# Patient Record
Sex: MALE | Race: WHITE | NOT HISPANIC OR LATINO | Employment: FULL TIME | URBAN - NONMETROPOLITAN AREA
[De-identification: names, ages, dates, MRNs, and addresses within clinical notes are randomized per-mention and may not be internally consistent; named-entity substitution may affect disease eponyms.]

---

## 2017-10-03 ENCOUNTER — APPOINTMENT (OUTPATIENT)
Dept: OCCUPATIONAL MEDICINE | Facility: OTHER | Age: 32
End: 2017-10-03

## 2017-10-03 ENCOUNTER — APPOINTMENT (OUTPATIENT)
Dept: CHIROPRACTIC MEDICINE | Facility: OTHER | Age: 32
End: 2017-10-03

## 2017-10-03 PROCEDURE — 92499 UNLISTED OPH SVC/PROCEDURE: CPT

## 2017-10-03 PROCEDURE — 92552 PURE TONE AUDIOMETRY AIR: CPT

## 2017-10-03 PROCEDURE — 99499 UNLISTED E&M SERVICE: CPT

## 2017-10-03 PROCEDURE — 99000 SPECIMEN HANDLING OFFICE-LAB: CPT

## 2024-05-11 ENCOUNTER — HOSPITAL ENCOUNTER (EMERGENCY)
Facility: HOSPITAL | Age: 39
Discharge: HOME | End: 2024-05-12
Attending: FAMILY MEDICINE
Payer: COMMERCIAL

## 2024-05-11 DIAGNOSIS — L03.116 CELLULITIS OF LEFT LOWER EXTREMITY: ICD-10-CM

## 2024-05-11 PROCEDURE — 99284 EMERGENCY DEPT VISIT MOD MDM: CPT | Mod: 25

## 2024-05-11 PROCEDURE — 250N000013 HC RX MED GY IP 250 OP 250 PS 637: Performed by: FAMILY MEDICINE

## 2024-05-11 PROCEDURE — 99284 EMERGENCY DEPT VISIT MOD MDM: CPT | Performed by: FAMILY MEDICINE

## 2024-05-11 RX ORDER — SULFAMETHOXAZOLE/TRIMETHOPRIM 800-160 MG
1 TABLET ORAL 2 TIMES DAILY
Qty: 20 TABLET | Refills: 0 | Status: SHIPPED | OUTPATIENT
Start: 2024-05-11 | End: 2024-05-21

## 2024-05-11 RX ORDER — SULFAMETHOXAZOLE/TRIMETHOPRIM 800-160 MG
1 TABLET ORAL ONCE
Status: COMPLETED | OUTPATIENT
Start: 2024-05-11 | End: 2024-05-11

## 2024-05-11 RX ADMIN — SULFAMETHOXAZOLE AND TRIMETHOPRIM 1 TABLET: 800; 160 TABLET ORAL at 23:43

## 2024-05-11 ASSESSMENT — COLUMBIA-SUICIDE SEVERITY RATING SCALE - C-SSRS
6. HAVE YOU EVER DONE ANYTHING, STARTED TO DO ANYTHING, OR PREPARED TO DO ANYTHING TO END YOUR LIFE?: NO
2. HAVE YOU ACTUALLY HAD ANY THOUGHTS OF KILLING YOURSELF IN THE PAST MONTH?: NO
1. IN THE PAST MONTH, HAVE YOU WISHED YOU WERE DEAD OR WISHED YOU COULD GO TO SLEEP AND NOT WAKE UP?: NO

## 2024-05-12 ENCOUNTER — APPOINTMENT (OUTPATIENT)
Dept: ULTRASOUND IMAGING | Facility: HOSPITAL | Age: 39
End: 2024-05-12
Attending: FAMILY MEDICINE
Payer: COMMERCIAL

## 2024-05-12 VITALS
SYSTOLIC BLOOD PRESSURE: 162 MMHG | TEMPERATURE: 97.5 F | HEART RATE: 79 BPM | RESPIRATION RATE: 18 BRPM | DIASTOLIC BLOOD PRESSURE: 101 MMHG | OXYGEN SATURATION: 95 %

## 2024-05-12 PROCEDURE — 93971 EXTREMITY STUDY: CPT | Mod: LT

## 2024-05-12 NOTE — ED PROVIDER NOTES
History     Chief Complaint   Patient presents with    Leg Pain     HPI  Savage Henley is a 38 year old male who presented to the ER with a chief complaint of left leg swelling and redness noted this evening.  Patient stated he had similar episode of left leg cellulitis years ago.  Stated he injured this leg 2 weeks ago and had a small healed abrasion of the anterior shin of the left leg otherwise denies any chest pain or shortness of breath.  Denies any other concern or complaint.  Denies any fever or chills.    Allergies:  Allergies   Allergen Reactions    Penicillins Anaphylaxis       Problem List:    There are no problems to display for this patient.       Past Medical History:    History reviewed. No pertinent past medical history.    Past Surgical History:    History reviewed. No pertinent surgical history.    Family History:    History reviewed. No pertinent family history.    Social History:  Marital Status:   [2]  Social History     Tobacco Use    Smoking status: Never    Smokeless tobacco: Never   Substance Use Topics    Alcohol use: Yes     Comment: occasional    Drug use: Not Currently        Medications:    sulfamethoxazole-trimethoprim (BACTRIM DS) 800-160 MG tablet          Review of Systems   All other systems reviewed and are negative.      Physical Exam   BP: 162/95  Pulse: 79  Temp: 97.5  F (36.4  C)  Resp: 18  SpO2: 96 %      Physical Exam  Constitutional:       General: He is not in acute distress.     Appearance: He is not ill-appearing, toxic-appearing or diaphoretic.   HENT:      Head: Atraumatic.      Nose: Nose normal. No congestion or rhinorrhea.   Eyes:      General: No scleral icterus.        Left eye: No discharge.      Extraocular Movements: Extraocular movements intact.      Conjunctiva/sclera: Conjunctivae normal.      Pupils: Pupils are equal, round, and reactive to light.   Neck:      Vascular: No carotid bruit.   Cardiovascular:      Rate and Rhythm: Normal rate and  regular rhythm.      Heart sounds: Normal heart sounds. No murmur heard.  Pulmonary:      Effort: Pulmonary effort is normal. No respiratory distress.      Breath sounds: Normal breath sounds. No stridor. No wheezing, rhonchi or rales.   Chest:      Chest wall: No tenderness.   Abdominal:      General: Bowel sounds are normal. There is no distension.      Palpations: Abdomen is soft. There is no mass.      Tenderness: There is no abdominal tenderness. There is no right CVA tenderness, left CVA tenderness, guarding or rebound.      Hernia: No hernia is present.   Musculoskeletal:         General: No swelling, tenderness, deformity or signs of injury.      Cervical back: Normal range of motion and neck supple. No rigidity or tenderness.      Right lower leg: No edema.      Left lower leg: No edema.      Comments: Small healing abrasion of the anterior shin of the left leg.  Minimal puffiness of the left foot and ankle.  No tenderness.  Minimal erythema.  Negative Homans' sign.   Lymphadenopathy:      Cervical: No cervical adenopathy.   Skin:     General: Skin is warm.      Coloration: Skin is not jaundiced or pale.      Findings: No bruising, erythema, lesion or rash.   Neurological:      General: No focal deficit present.      Mental Status: He is oriented to person, place, and time. Mental status is at baseline.      Cranial Nerves: No cranial nerve deficit.      Sensory: No sensory deficit.      Motor: No weakness.      Coordination: Coordination normal.      Gait: Gait normal.      Deep Tendon Reflexes: Reflexes normal.   Psychiatric:         Mood and Affect: Mood normal.         ED Course   Patient was seen and examined shortly after arrival.  Stable.  Doppler ultrasound negative for DVT per prelim report.  Official reading by radiologist still pending..  Possible early cellulitis.  He is allergic to penicillin.  Started on Bactrim.  Advised to    Rest and stay well-hydrated  Alternate Tylenol and ibuprofen for  pain and discomfort or fever  Close follow-up with PCP  Come back for any concern or any worsening symptoms  Patient agrees with the plan.  Stable for discharge.     Procedures           No results found for this or any previous visit (from the past 24 hour(s)).    Medications   sulfamethoxazole-trimethoprim (BACTRIM DS) 800-160 MG per tablet 1 tablet (1 tablet Oral $Given 5/11/24 8065)       Assessments & Plan (with Medical Decision Making)     I have reviewed the nursing notes.    I have reviewed the findings, diagnosis, plan and need for follow up with the patient.        New Prescriptions    SULFAMETHOXAZOLE-TRIMETHOPRIM (BACTRIM DS) 800-160 MG TABLET    Take 1 tablet by mouth 2 times daily for 10 days       Final diagnoses:   Cellulitis of left lower extremity       5/11/2024   [facility]       Maribell Griffin MD  05/12/24 0036

## 2024-05-12 NOTE — ED NOTES
Patient presents with complaints of some left ankle swelling earlier in the day. He states he thought it was reddened and warm . He does endorse a fall 2 weeks on Monday he states there's some pain on the inside of his knee and there is also pain when he points his toes on the left foot. He says it starts just above the ankle and down the top of his foot. There is no redness, swelling, or warmth to the touch at this time.

## 2024-05-12 NOTE — ED TRIAGE NOTES
Patient presents with c/o left foot and ankle swelling that started today. Denies any injury to area. HX of cellulitis. Patient reports left shin in tender to the touch. Left foot, ankle, and shin red and warm. Denies any fevers at home. Ambulated into triage steady gait.

## 2025-02-02 ENCOUNTER — HOSPITAL ENCOUNTER (EMERGENCY)
Facility: HOSPITAL | Age: 40
Discharge: HOME | End: 2025-02-02
Attending: PHYSICIAN ASSISTANT
Payer: COMMERCIAL

## 2025-02-02 VITALS
HEART RATE: 99 BPM | TEMPERATURE: 98.4 F | BODY MASS INDEX: 37.69 KG/M2 | WEIGHT: 284.4 LBS | RESPIRATION RATE: 16 BRPM | DIASTOLIC BLOOD PRESSURE: 93 MMHG | SYSTOLIC BLOOD PRESSURE: 167 MMHG | HEIGHT: 73 IN | OXYGEN SATURATION: 97 %

## 2025-02-02 DIAGNOSIS — K04.01 ACUTE PULPITIS: ICD-10-CM

## 2025-02-02 DIAGNOSIS — K08.89 PAIN, DENTAL: ICD-10-CM

## 2025-02-02 PROCEDURE — 99213 OFFICE O/P EST LOW 20 MIN: CPT | Performed by: PHYSICIAN ASSISTANT

## 2025-02-02 PROCEDURE — G0463 HOSPITAL OUTPT CLINIC VISIT: HCPCS

## 2025-02-02 RX ORDER — LISINOPRIL 20 MG/1
20 TABLET ORAL DAILY
COMMUNITY

## 2025-02-02 RX ORDER — DOXYCYCLINE 100 MG/1
100 CAPSULE ORAL 2 TIMES DAILY
Qty: 14 CAPSULE | Refills: 0 | Status: SHIPPED | OUTPATIENT
Start: 2025-02-02 | End: 2025-02-09

## 2025-02-02 ASSESSMENT — ENCOUNTER SYMPTOMS
TROUBLE SWALLOWING: 0
DYSURIA: 0
RHINORRHEA: 0
ACTIVITY CHANGE: 0
SLEEP DISTURBANCE: 1
FEVER: 0
SORE THROAT: 0
FACIAL SWELLING: 0
COUGH: 0
WOUND: 0
ABDOMINAL PAIN: 0
EYE REDNESS: 0
BACK PAIN: 0
APPETITE CHANGE: 0
HEADACHES: 1

## 2025-02-02 NOTE — DISCHARGE INSTRUCTIONS
Follow up with dentist ASAP.     Ibuprofen and tylenol for pain. Can take up to 800 mg of ibuprofen every 6 hours if needed (avoid high dose for >2 weeks).

## 2025-02-02 NOTE — ED PROVIDER NOTES
"  History     Chief Complaint   Patient presents with    Dental Pain     HPI  Savage Henley is a 39 year old male who presents for evaluation of right lower dental pain. He believes his crown cracked about 1 week ago. Since the area around the tooth has become progressively painful and feels swollen. It feels a little awkward to swallow. He has tried tylenol without relief of pain. He will call tomorrow to make a dentist appointment but wants to know what to do in the meantime.     Allergies:  Allergies   Allergen Reactions    Penicillins Anaphylaxis       Problem List:    There are no active problems to display for this patient.       Past Medical History:    No past medical history on file.    Past Surgical History:    No past surgical history on file.    Family History:    No family history on file.    Social History:  Marital Status:   [2]  Social History     Tobacco Use    Smoking status: Never    Smokeless tobacco: Never   Substance Use Topics    Alcohol use: Yes     Comment: occasional    Drug use: Not Currently        Medications:    doxycycline hyclate (VIBRAMYCIN) 100 MG capsule  lisinopril (ZESTRIL) 20 MG tablet          Review of Systems   Constitutional:  Negative for activity change, appetite change and fever.   HENT:  Positive for dental problem. Negative for congestion, facial swelling, rhinorrhea, sore throat and trouble swallowing.    Eyes:  Negative for redness.   Respiratory:  Negative for cough.    Cardiovascular:  Negative for chest pain.   Gastrointestinal:  Negative for abdominal pain.   Genitourinary:  Negative for dysuria.   Musculoskeletal:  Negative for back pain.   Skin:  Negative for wound.   Neurological:  Positive for headaches.   Psychiatric/Behavioral:  Positive for sleep disturbance.        Physical Exam   BP: (!) 167/93  Pulse: 99  Temp: 98.4  F (36.9  C)  Resp: 16  Height: 185.4 cm (6' 1\")  Weight: 129 kg (284 lb 6.4 oz)  SpO2: 97 %      Physical Exam  Vitals and nursing " note reviewed.   Constitutional:       General: He is not in acute distress.     Appearance: Normal appearance. He is normal weight.   HENT:      Head: Normocephalic and atraumatic.      Jaw: Tenderness (Lower mid right) present. No trismus or swelling.      Salivary Glands: Right salivary gland is not diffusely enlarged. Left salivary gland is not diffusely enlarged.      Right Ear: External ear normal.      Left Ear: External ear normal.      Nose: Nose normal.      Mouth/Throat:      Mouth: Mucous membranes are moist.      Tongue: Tongue does not deviate from midline.      Palate: No mass.      Pharynx: Uvula midline. No pharyngeal swelling.     Eyes:      Conjunctiva/sclera: Conjunctivae normal.   Pulmonary:      Effort: Pulmonary effort is normal.   Abdominal:      General: Abdomen is flat.   Musculoskeletal:         General: Normal range of motion.      Cervical back: Normal range of motion.   Skin:     General: Skin is warm.      Capillary Refill: Capillary refill takes less than 2 seconds.   Neurological:      General: No focal deficit present.      Mental Status: He is alert. Mental status is at baseline.   Psychiatric:         Mood and Affect: Mood normal.         Behavior: Behavior normal.         ED Course          No results found for this or any previous visit (from the past 24 hours).    Medications - No data to display    Assessments & Plan (with Medical Decision Making)     I have reviewed the nursing notes.    I have reviewed the findings, diagnosis, plan and need for follow up with the patient.    Medical Decision Making  The patient's presentation was of low complexity (an acute and uncomplicated illness or injury).    The patient's evaluation involved:  history and exam without other MDM data elements    The patient's management necessitated moderate risk (prescription drug management including medications given in the ED).    Afebrile, hypertensive. No palpable or visible abscess. Prescribed  doxycycline - he has true allergy to PCNs and is unsure of response to cephalosporins. He plans to call dentistry tomorrow to make an appointment. Ibuprofen and tylenol for pain. Warm salt water swishes frequently.     Discharge Medication List as of 2/2/2025  1:23 PM        START taking these medications    Details   doxycycline hyclate (VIBRAMYCIN) 100 MG capsule Take 1 capsule (100 mg) by mouth 2 times daily for 7 days., Disp-14 capsule, R-0, E-PrescribeUnknown reaction to cephalosporins, allergic to PCN (anaphylaxis). Chose this as alternative.             Final diagnoses:   Acute pulpitis   Pain, dental       2/2/2025   [facility]

## 2025-02-02 NOTE — ED TRIAGE NOTES
Pt presents with c/o right lower dental pain. Reports that he thinks a crown cracked last week. Tried calling his dentist but did not get an answer. Soreness along jaw line. Pt has been taking tylenol.